# Patient Record
(demographics unavailable — no encounter records)

---

## 2025-06-11 NOTE — HISTORY OF PRESENT ILLNESS
[Work related] : work related [8] : 8 [6] : 6 [Dull/Aching] : dull/aching [Sharp] : sharp [Frequent] : frequent [Household chores] : household chores [Leisure] : leisure [Sleep] : sleep [Work] : work [Rest] : rest [Meds] : meds [] : yes [de-identified] : JOHN CORRIGAN is a 60 year old F here for a follow up for the right shoulder, WC DOI 06/26/21. Pt states no pain in rt shoulder today, only when weather is bad. Pt is attending physical therapy 2x times a week with benefit.  [FreeTextEntry1] : Right shoulder [FreeTextEntry3] : 06/26/21 [FreeTextEntry7] : Pain sometimes radiates down to right hand, numbness in right fingers [de-identified] : Overuse

## 2025-06-11 NOTE — PHYSICAL EXAM
[Right] : right shoulder [Minimal] : minimal [Sitting] : sitting [Left] : left shoulder [5 ___] : forward flexion 5[unfilled]/5 [5___] : internal rotation 5[unfilled]/5 [] : negative drop-arm test [TWNoteComboBox7] : active forward flexion 170 degrees [de-identified] : active abduction 160 degrees [TWNoteComboBox6] : internal rotation 70 degrees [de-identified] : external rotation 80 degrees

## 2025-06-11 NOTE — WORK
[Partial] : partial [Does not reveal pre-existing condition(s) that may affect treatment/prognosis] : does not reveal pre-existing condition(s) that may affect treatment/prognosis [Can return to work with limitations on ______] : can return to work with limitations on [unfilled] [N/A] : : Not Applicable [Patient] : patient [No Rx restrictions] : No Rx restrictions. [FreeTextEntry1] : good [Has the patient reached Maximum Medical Improvement? If yes, indicate date___] : Yes, on [unfilled] [Is there permanent partial impairment?] : Yes [FreeTextEntry6] : right shoulder 20% loss  based on 2018 guidelines  goniometer used  measured 3 timeswith same rom each time  pt has had left shoulder surgery  [___lbs] : Occasionally: [unfilled] lbs [] : Frequently [Medium Work] : Medium work [Has the patient had an injury/illness since the date of injury which impacts residual functional capacity?] : No [Would the patient benefit from vocational rehabilitation? If Yes, explain below.] :  No

## 2025-06-11 NOTE — HISTORY OF PRESENT ILLNESS
[Work related] : work related [8] : 8 [6] : 6 [Dull/Aching] : dull/aching [Sharp] : sharp [Household chores] : household chores [Frequent] : frequent [Leisure] : leisure [Work] : work [Sleep] : sleep [Rest] : rest [Meds] : meds [] : yes [de-identified] : JOHN CORRIGAN is a 60 year old F here for a follow up for the right shoulder, WC DOI 06/26/21. Pt states no pain in rt shoulder today, only when weather is bad. Pt is attending physical therapy 2x times a week with benefit.  [FreeTextEntry1] : Right shoulder [FreeTextEntry3] : 06/26/21 [FreeTextEntry7] : Pain sometimes radiates down to right hand, numbness in right fingers [de-identified] : Overuse

## 2025-06-11 NOTE — PHYSICAL EXAM
[Right] : right shoulder [Minimal] : minimal [Sitting] : sitting [Left] : left shoulder [5 ___] : forward flexion 5[unfilled]/5 [5___] : internal rotation 5[unfilled]/5 [] : negative drop-arm test [TWNoteComboBox7] : active forward flexion 170 degrees [de-identified] : active abduction 160 degrees [TWNoteComboBox6] : internal rotation 70 degrees [de-identified] : external rotation 80 degrees

## 2025-06-11 NOTE — DISCUSSION/SUMMARY
[de-identified] : General Dx Discussion The patient was advised of the diagnosis. The natural history of the pathology was explained in full to the patient in layman's terms. All questions were answered. The risks and benefits of surgical and non-surgical treatment alternatives were explained in full to the patient.  Case Discussed. f/u PRN

## 2025-06-11 NOTE — WORK
[Partial] : partial [Can return to work with limitations on ______] : can return to work with limitations on [unfilled] [Does not reveal pre-existing condition(s) that may affect treatment/prognosis] : does not reveal pre-existing condition(s) that may affect treatment/prognosis [N/A] : : Not Applicable [Patient] : patient [No Rx restrictions] : No Rx restrictions. [FreeTextEntry1] : good [Has the patient reached Maximum Medical Improvement? If yes, indicate date___] : Yes, on [unfilled] [Is there permanent partial impairment?] : Yes [FreeTextEntry6] : right shoulder 20% loss  based on 2018 guidelines  goniometer used  measured 3 timeswith same rom each time  pt has had left shoulder surgery  [___lbs] : Occasionally: [unfilled] lbs [] : Frequently [Medium Work] : Medium work [Has the patient had an injury/illness since the date of injury which impacts residual functional capacity?] : No [Would the patient benefit from vocational rehabilitation? If Yes, explain below.] :  No

## 2025-06-11 NOTE — REASON FOR VISIT
[FreeTextEntry2] : Follow up: Right shoulder, rt shoulder doing well  here for [facility] right shoulder  WC DOI: 06/26/21

## 2025-06-11 NOTE — DISCUSSION/SUMMARY
[de-identified] : General Dx Discussion The patient was advised of the diagnosis. The natural history of the pathology was explained in full to the patient in layman's terms. All questions were answered. The risks and benefits of surgical and non-surgical treatment alternatives were explained in full to the patient.  Case Discussed. f/u PRN